# Patient Record
Sex: MALE | Race: BLACK OR AFRICAN AMERICAN | URBAN - METROPOLITAN AREA
[De-identification: names, ages, dates, MRNs, and addresses within clinical notes are randomized per-mention and may not be internally consistent; named-entity substitution may affect disease eponyms.]

---

## 2019-09-05 ENCOUNTER — APPOINTMENT (RX ONLY)
Dept: URBAN - METROPOLITAN AREA CLINIC 61 | Facility: CLINIC | Age: 34
Setting detail: DERMATOLOGY
End: 2019-09-05

## 2019-09-05 DIAGNOSIS — L24 IRRITANT CONTACT DERMATITIS: ICD-10-CM | Status: WELL CONTROLLED

## 2019-09-05 DIAGNOSIS — B35.3 TINEA PEDIS: ICD-10-CM

## 2019-09-05 PROBLEM — L24.9 IRRITANT CONTACT DERMATITIS, UNSPECIFIED CAUSE: Status: ACTIVE | Noted: 2019-09-05

## 2019-09-05 PROCEDURE — ? COUNSELING

## 2019-09-05 PROCEDURE — ? INVENTORY

## 2019-09-05 PROCEDURE — 99213 OFFICE O/P EST LOW 20 MIN: CPT

## 2019-09-05 PROCEDURE — ? PRESCRIPTION

## 2019-09-05 RX ORDER — KETOCONAZOLE 20 MG/G
CREAM TOPICAL
Qty: 1 | Refills: 1 | COMMUNITY
Start: 2019-09-05

## 2019-09-05 RX ORDER — FLUCONAZOLE 40 MG/ML
POWDER, FOR SUSPENSION ORAL
Qty: 20 | Refills: 1 | COMMUNITY
Start: 2019-09-05

## 2019-09-05 RX ORDER — CLOBETASOL PROPIONATE 0.5 MG/G
CREAM TOPICAL BID
Qty: 1 | Refills: 0 | COMMUNITY
Start: 2019-09-05

## 2019-09-05 RX ADMIN — FLUCONAZOLE: 40 POWDER, FOR SUSPENSION ORAL at 14:33

## 2019-09-05 RX ADMIN — KETOCONAZOLE: 20 CREAM TOPICAL at 14:33

## 2019-09-05 RX ADMIN — CLOBETASOL PROPIONATE: 0.5 CREAM TOPICAL at 14:34

## 2019-09-05 ASSESSMENT — LOCATION SIMPLE DESCRIPTION DERM: LOCATION SIMPLE: LEFT 2ND TOE

## 2019-09-05 ASSESSMENT — LOCATION DETAILED DESCRIPTION DERM: LOCATION DETAILED: LEFT DORSAL 2ND TOE

## 2019-09-05 ASSESSMENT — LOCATION ZONE DERM: LOCATION ZONE: TOE

## 2019-09-05 NOTE — HPI: FOLLOW UP OTHER
What Is Your Reason For Requesting A Follow-Up Appointment?: Patient is here for a refill on his clobetasol cream. He notes he has a history of irritant contact dermatitis and that the topical clobetasol cream helps keep it under control. He uses it sparingly when needed. He denies a current rash on his hands today, but notes a rash on his feet.